# Patient Record
Sex: MALE | Employment: UNEMPLOYED | URBAN - METROPOLITAN AREA
[De-identification: names, ages, dates, MRNs, and addresses within clinical notes are randomized per-mention and may not be internally consistent; named-entity substitution may affect disease eponyms.]

---

## 2020-01-22 ENCOUNTER — HOSPITAL ENCOUNTER (OUTPATIENT)
Dept: GENERAL RADIOLOGY | Age: 45
Discharge: HOME | End: 2020-01-24
Payer: COMMERCIAL

## 2020-01-22 PROCEDURE — G0279 TOMOSYNTHESIS, MAMMO: HCPCS

## 2020-01-22 PROCEDURE — 76642 ULTRASOUND BREAST LIMITED: CPT

## 2020-01-24 ENCOUNTER — OFFICE VISIT (OUTPATIENT)
Dept: CARDIOLOGY CLINIC | Age: 45
End: 2020-01-24
Payer: COMMERCIAL

## 2020-01-24 VITALS
DIASTOLIC BLOOD PRESSURE: 86 MMHG | HEIGHT: 74 IN | HEART RATE: 68 BPM | BODY MASS INDEX: 31.06 KG/M2 | WEIGHT: 242 LBS | SYSTOLIC BLOOD PRESSURE: 122 MMHG

## 2020-01-24 PROCEDURE — G8427 DOCREV CUR MEDS BY ELIG CLIN: HCPCS | Performed by: INTERNAL MEDICINE

## 2020-01-24 PROCEDURE — 93000 ELECTROCARDIOGRAM COMPLETE: CPT | Performed by: INTERNAL MEDICINE

## 2020-01-24 PROCEDURE — G8419 CALC BMI OUT NRM PARAM NOF/U: HCPCS | Performed by: INTERNAL MEDICINE

## 2020-01-24 PROCEDURE — 99204 OFFICE O/P NEW MOD 45 MIN: CPT | Performed by: INTERNAL MEDICINE

## 2020-01-24 PROCEDURE — G8484 FLU IMMUNIZE NO ADMIN: HCPCS | Performed by: INTERNAL MEDICINE

## 2020-01-24 PROCEDURE — 1036F TOBACCO NON-USER: CPT | Performed by: INTERNAL MEDICINE

## 2020-01-24 RX ORDER — ICOSAPENT ETHYL 1000 MG/1
1 CAPSULE ORAL 2 TIMES DAILY
COMMUNITY
Start: 2020-01-12 | End: 2020-03-03 | Stop reason: ALTCHOICE

## 2020-01-24 SDOH — HEALTH STABILITY: MENTAL HEALTH: HOW OFTEN DO YOU HAVE A DRINK CONTAINING ALCOHOL?: NEVER

## 2020-01-24 NOTE — PROGRESS NOTES
meetings of clubs or organizations: Not on file     Relationship status: Not on file    Intimate partner violence:     Fear of current or ex partner: Not on file     Emotionally abused: Not on file     Physically abused: Not on file     Forced sexual activity: Not on file   Other Topics Concern    Not on file   Social History Narrative    Not on file       Family History   Problem Relation Age of Onset    Heart Disease Mother     Heart Attack Brother        Review of Systems:  Heart: as above   Lungs: as above   Eyes: denies changes in vision or discharge. Ears: denies changes in hearing or pain. Nose: denies epistaxis or masses   Throat: denies sore throat or trouble swallowing. Neuro: denies numbness, tingling, tremors. Skin: denies rashes or itching. : denies hematuria, dysuria   GI: denies vomiting, diarrhea   Psych: denies mood changed, anxiety, depression. All other systems negative. Physical Exam   /86   Pulse 68   Ht 6' 2\" (1.88 m)   Wt 242 lb (109.8 kg)   BMI 31.07 kg/m²   Constitutional: Oriented to person, place, and time. Well-developed and well-nourished. No distress. Head: Normocephalic and atraumatic. Eyes: EOM are normal. Pupils are equal, round, and reactive to light. Neck: Normal range of motion. Neck supple. No hepatojugular reflux and no JVD present. Carotid bruit is not present. No tracheal deviation present. No thyromegaly present. Cardiovascular: Normal rate, regular rhythm, normal heart sounds and intact distal pulses. Exam reveals no gallop and no friction rub. No murmur heard. Pulmonary/Chest: Effort normal and breath sounds normal. No respiratory distress. No wheezes. No rales. No tenderness. Abdominal: Soft. Bowel sounds are normal. No distension and no mass. No tenderness. No rebound and no guarding. Musculoskeletal: Normal range of motion. No edema and no tenderness. Lymphadenopathy:   No cervical adenopathy.  No groin adenopathy. Neurological: Alert and oriented to person, place, and time. Skin: Skin is warm and dry. No rash noted. Not diaphoretic. No erythema. Psychiatric: Normal mood and affect. Behavior is normal.     EKG:  normal EKG, normal sinus rhythm. ASSESSMENT AND PLAN:  Patient Active Problem List   Diagnosis    Chest pain     1. Chest pain: Stress echo. Adela Dsouza D.O.   Cardiologist  Cardiology, [facility]

## 2020-01-29 ENCOUNTER — TELEPHONE (OUTPATIENT)
Dept: CARDIOLOGY CLINIC | Age: 45
End: 2020-01-29

## 2020-02-06 ENCOUNTER — TELEPHONE (OUTPATIENT)
Dept: CARDIOLOGY | Age: 45
End: 2020-02-06

## 2020-02-07 ENCOUNTER — HOSPITAL ENCOUNTER (OUTPATIENT)
Dept: CARDIOLOGY | Age: 45
Discharge: HOME | End: 2020-02-07
Payer: COMMERCIAL

## 2020-02-07 ENCOUNTER — TELEPHONE (OUTPATIENT)
Dept: CARDIOLOGY CLINIC | Age: 45
End: 2020-02-07

## 2020-02-07 VITALS
HEART RATE: 103 BPM | DIASTOLIC BLOOD PRESSURE: 70 MMHG | SYSTOLIC BLOOD PRESSURE: 120 MMHG | WEIGHT: 242 LBS | BODY MASS INDEX: 31.06 KG/M2 | OXYGEN SATURATION: 98 % | HEIGHT: 74 IN

## 2020-02-07 PROCEDURE — 93017 CV STRESS TEST TRACING ONLY: CPT

## 2020-02-07 NOTE — PROCEDURES
average. 5. Low risk general exercise treadmill test.    Thank you for sending your patient to this [facility].     Electronically signed by Vamsi Barrera MD on 2/7/20 at 2:22 PM

## 2020-02-13 ENCOUNTER — PREP FOR PROCEDURE (OUTPATIENT)
Dept: SURGERY | Age: 45
End: 2020-02-13

## 2020-02-13 RX ORDER — SODIUM CHLORIDE 0.9 % (FLUSH) 0.9 %
10 SYRINGE (ML) INJECTION EVERY 12 HOURS SCHEDULED
Status: CANCELLED | OUTPATIENT
Start: 2020-02-13

## 2020-02-13 RX ORDER — SODIUM CHLORIDE, SODIUM LACTATE, POTASSIUM CHLORIDE, CALCIUM CHLORIDE 600; 310; 30; 20 MG/100ML; MG/100ML; MG/100ML; MG/100ML
INJECTION, SOLUTION INTRAVENOUS CONTINUOUS
Status: CANCELLED | OUTPATIENT
Start: 2020-02-13

## 2020-02-21 ENCOUNTER — OFFICE VISIT (OUTPATIENT)
Dept: CARDIOLOGY CLINIC | Age: 45
End: 2020-02-21
Payer: COMMERCIAL

## 2020-02-21 VITALS
HEART RATE: 76 BPM | HEIGHT: 74 IN | BODY MASS INDEX: 30.8 KG/M2 | SYSTOLIC BLOOD PRESSURE: 122 MMHG | WEIGHT: 240 LBS | DIASTOLIC BLOOD PRESSURE: 76 MMHG | RESPIRATION RATE: 18 BRPM

## 2020-02-21 PROCEDURE — G8417 CALC BMI ABV UP PARAM F/U: HCPCS | Performed by: INTERNAL MEDICINE

## 2020-02-21 PROCEDURE — G8427 DOCREV CUR MEDS BY ELIG CLIN: HCPCS | Performed by: INTERNAL MEDICINE

## 2020-02-21 PROCEDURE — 99214 OFFICE O/P EST MOD 30 MIN: CPT | Performed by: INTERNAL MEDICINE

## 2020-02-21 PROCEDURE — 93000 ELECTROCARDIOGRAM COMPLETE: CPT | Performed by: INTERNAL MEDICINE

## 2020-02-21 PROCEDURE — G8484 FLU IMMUNIZE NO ADMIN: HCPCS | Performed by: INTERNAL MEDICINE

## 2020-02-21 PROCEDURE — 1036F TOBACCO NON-USER: CPT | Performed by: INTERNAL MEDICINE

## 2020-02-21 NOTE — PROGRESS NOTES
Concern    Not on file   Social History Narrative    Not on file       Family History   Problem Relation Age of Onset    Heart Disease Mother     Heart Attack Brother     No Known Problems Sister     No Known Problems Brother        Review of Systems:  Heart: as above   Lungs: as above   Eyes: denies changes in vision or discharge. Ears: denies changes in hearing or pain. Nose: denies epistaxis or masses   Throat: denies sore throat or trouble swallowing. Neuro: denies numbness, tingling, tremors. Skin: denies rashes or itching. : denies hematuria, dysuria   GI: denies vomiting, diarrhea   Psych: denies mood changed, anxiety, depression. All other systems negative. Physical Exam   /76   Pulse 76   Resp 18   Ht 6' 2\" (1.88 m)   Wt 240 lb (108.9 kg)   BMI 30.81 kg/m²   Constitutional: Oriented to person, place, and time. Well-developed and well-nourished. No distress. Head: Normocephalic and atraumatic. Eyes: EOM are normal. Pupils are equal, round, and reactive to light. Neck: Normal range of motion. Neck supple. No hepatojugular reflux and no JVD present. Carotid bruit is not present. No tracheal deviation present. No thyromegaly present. Cardiovascular: Normal rate, regular rhythm, normal heart sounds and intact distal pulses. Exam reveals no gallop and no friction rub. No murmur heard. Pulmonary/Chest: Effort normal and breath sounds normal. No respiratory distress. No wheezes. No rales. No tenderness. Abdominal: Soft. Bowel sounds are normal. No distension and no mass. No tenderness. No rebound and no guarding. Musculoskeletal: Normal range of motion. No edema and no tenderness. Lymphadenopathy:   No cervical adenopathy. No groin adenopathy. Neurological: Alert and oriented to person, place, and time. Skin: Skin is warm and dry. No rash noted. Not diaphoretic. No erythema. Psychiatric: Normal mood and affect.  Behavior is normal.     EKG:  normal EKG, normal sinus rhythm. ASSESSMENT AND PLAN:  Patient Active Problem List   Diagnosis    Chest pain     1. Chest pain: Stress normal.     Andrew Walden D.O.   Cardiologist  Cardiology, [facility]

## 2020-03-03 RX ORDER — ICOSAPENT ETHYL 1000 MG/1
2 CAPSULE ORAL DAILY
COMMUNITY

## 2020-03-03 NOTE — PROGRESS NOTES
[facility] PRE-ADMISSION TESTING INSTRUCTIONS    The Preadmission Testing patient is instructed accordingly using the following criteria (check applicable):    ARRIVAL INSTRUCTIONS:  [x] Parking the day of Surgery is located in the Main Entrance lot. Upon entering the door, make an immediate right to the surgery reception desk    [] [phone] is available Monday through Friday 6 am to 6 pm    [x] Bring photo ID and insurance card    [] Bring in a copy of Living will or Durable Power of  papers. [x] Please be sure to arrange for responsible adult to provide transportation to and from the hospital    [x] Please arrange for responsible adult to be with you for the 24 hour period post procedure due to having anesthesia      GENERAL INSTRUCTIONS:    [x] Nothing by mouth after midnight, including gum, candy, mints or water    [x] You may brush your teeth, but do not swallow any water    [] Take medications as instructed with 1-2 oz of water    [] Stop herbal supplements and vitamins 5 days prior to procedure    [] Follow preop dosing of blood thinners per physician instructions    [] Take 1/2 dose of evening insulin, but no insulin after midnight    [] No oral diabetic medications after midnight    [] If diabetic and have low blood sugar or feel symptomatic, take 1-2oz apple juice only    [] Bring inhalers day of surgery    [] Bring C-PAP/ Bi-Pap day of surgery    [] Bring urine specimen day of surgery    [x] Shower or bath with soap, lather and rinse well, AM of Surgery, no lotion, powders or creams to surgical site    [] Follow bowel prep as instructed per surgeon    [x] No tobacco products within 24 hours of surgery     [x] No alcohol or illegal drug use within 24 hours of surgery.     [x] Jewelry, body piercing's, eyeglasses, contact lenses and dentures are not permitted into surgery (bring cases)      [] Please do not wear any nail polish, make up or hair products on the day of surgery    [x] If not already done, you can expect a call from registration    [x] You can expect a call the business day prior to procedure to notify you if your arrival time changes    [x] If you receive a survey after surgery we would greatly appreciate your comments    [] Parent/guardian of a minor must accompany their child and remain on the premises  the entire time they are under our care     [] Pediatric patients may bring favorite toy, blanket or comfort item with them    [] A caregiver or family member must remain with the patient during their stay if they are mentally handicapped, have dementia, disoriented or unable to use a call light or would be a safety concern if left unattended    [x] Please notify surgeon if you develop any illness between now and time of surgery (cold, cough, sore throat, fever, nausea, vomiting) or any signs of infections  including skin, wounds, and dental.    [x]  The Outpatient Pharmacy is available to fill your prescription here on your day of surgery, ask your preop nurse for details    [] Other instructions  EDUCATIONAL MATERIALS PROVIDED:    [] PAT Preoperative Education Packet/Booklet     [] Medication List    [] Fluoroscopy Information Pamphlet    [] Transfusion bracelet applied with instructions    [] Joint replacement video reviewed    [] Shower with soap, lather and rinse well, and use CHG wipes provided the evening before surgery as instructed

## 2020-03-05 ENCOUNTER — ANESTHESIA EVENT (OUTPATIENT)
Dept: OPERATING ROOM | Age: 45
End: 2020-03-05
Payer: COMMERCIAL

## 2020-03-06 ENCOUNTER — ANESTHESIA (OUTPATIENT)
Dept: OPERATING ROOM | Age: 45
End: 2020-03-06
Payer: COMMERCIAL

## 2020-03-06 ENCOUNTER — HOSPITAL ENCOUNTER (OUTPATIENT)
Age: 45
Setting detail: OUTPATIENT SURGERY
Discharge: HOME | End: 2020-03-06
Attending: SURGERY | Admitting: SURGERY
Payer: COMMERCIAL

## 2020-03-06 VITALS
BODY MASS INDEX: 31.44 KG/M2 | SYSTOLIC BLOOD PRESSURE: 110 MMHG | HEIGHT: 74 IN | HEART RATE: 66 BPM | OXYGEN SATURATION: 97 % | TEMPERATURE: 97.7 F | DIASTOLIC BLOOD PRESSURE: 67 MMHG | RESPIRATION RATE: 20 BRPM | WEIGHT: 245 LBS

## 2020-03-06 VITALS
RESPIRATION RATE: 10 BRPM | DIASTOLIC BLOOD PRESSURE: 74 MMHG | SYSTOLIC BLOOD PRESSURE: 122 MMHG | OXYGEN SATURATION: 98 %

## 2020-03-06 PROBLEM — G89.18 POSTOPERATIVE PAIN: Status: ACTIVE | Noted: 2020-03-06

## 2020-03-06 PROBLEM — N62 GYNECOMASTIA: Status: ACTIVE | Noted: 2020-03-06

## 2020-03-06 PROCEDURE — 3700000001 HC ADD 15 MINUTES (ANESTHESIA): Performed by: SURGERY

## 2020-03-06 PROCEDURE — 7100000011 HC PHASE II RECOVERY - ADDTL 15 MIN: Performed by: SURGERY

## 2020-03-06 PROCEDURE — 3600000012 HC SURGERY LEVEL 2 ADDTL 15MIN: Performed by: SURGERY

## 2020-03-06 PROCEDURE — 6360000002 HC RX W HCPCS

## 2020-03-06 PROCEDURE — 3700000000 HC ANESTHESIA ATTENDED CARE: Performed by: SURGERY

## 2020-03-06 PROCEDURE — 6360000002 HC RX W HCPCS: Performed by: SURGERY

## 2020-03-06 PROCEDURE — 3600000002 HC SURGERY LEVEL 2 BASE: Performed by: SURGERY

## 2020-03-06 PROCEDURE — 2580000003 HC RX 258: Performed by: SURGERY

## 2020-03-06 PROCEDURE — 2500000003 HC RX 250 WO HCPCS

## 2020-03-06 PROCEDURE — 2500000003 HC RX 250 WO HCPCS: Performed by: SURGERY

## 2020-03-06 PROCEDURE — 88305 TISSUE EXAM BY PATHOLOGIST: CPT

## 2020-03-06 PROCEDURE — 2709999900 HC NON-CHARGEABLE SUPPLY: Performed by: SURGERY

## 2020-03-06 PROCEDURE — 7100000010 HC PHASE II RECOVERY - FIRST 15 MIN: Performed by: SURGERY

## 2020-03-06 RX ORDER — FENTANYL CITRATE 50 UG/ML
50 INJECTION, SOLUTION INTRAMUSCULAR; INTRAVENOUS EVERY 5 MIN PRN
Status: DISCONTINUED | OUTPATIENT
Start: 2020-03-06 | End: 2020-03-06 | Stop reason: HOSPADM

## 2020-03-06 RX ORDER — DIPHENHYDRAMINE HYDROCHLORIDE 50 MG/ML
12.5 INJECTION INTRAMUSCULAR; INTRAVENOUS
Status: DISCONTINUED | OUTPATIENT
Start: 2020-03-06 | End: 2020-03-06 | Stop reason: HOSPADM

## 2020-03-06 RX ORDER — LIDOCAINE HYDROCHLORIDE 10 MG/ML
INJECTION, SOLUTION EPIDURAL; INFILTRATION; INTRACAUDAL; PERINEURAL PRN
Status: DISCONTINUED | OUTPATIENT
Start: 2020-03-06 | End: 2020-03-06 | Stop reason: ALTCHOICE

## 2020-03-06 RX ORDER — FENTANYL CITRATE 50 UG/ML
INJECTION, SOLUTION INTRAMUSCULAR; INTRAVENOUS PRN
Status: DISCONTINUED | OUTPATIENT
Start: 2020-03-06 | End: 2020-03-06 | Stop reason: SDUPTHER

## 2020-03-06 RX ORDER — HYDROCODONE BITARTRATE AND ACETAMINOPHEN 5; 325 MG/1; MG/1
1 TABLET ORAL EVERY 6 HOURS PRN
Qty: 15 TABLET | Refills: 0 | Status: SHIPPED | OUTPATIENT
Start: 2020-03-06 | End: 2020-03-09

## 2020-03-06 RX ORDER — MEPERIDINE HYDROCHLORIDE 25 MG/ML
12.5 INJECTION INTRAMUSCULAR; INTRAVENOUS; SUBCUTANEOUS EVERY 5 MIN PRN
Status: DISCONTINUED | OUTPATIENT
Start: 2020-03-06 | End: 2020-03-06 | Stop reason: HOSPADM

## 2020-03-06 RX ORDER — KETAMINE HYDROCHLORIDE 10 MG/ML
INJECTION, SOLUTION INTRAMUSCULAR; INTRAVENOUS PRN
Status: DISCONTINUED | OUTPATIENT
Start: 2020-03-06 | End: 2020-03-06 | Stop reason: SDUPTHER

## 2020-03-06 RX ORDER — PROPOFOL 10 MG/ML
INJECTION, EMULSION INTRAVENOUS CONTINUOUS PRN
Status: DISCONTINUED | OUTPATIENT
Start: 2020-03-06 | End: 2020-03-06 | Stop reason: SDUPTHER

## 2020-03-06 RX ORDER — SODIUM CHLORIDE, SODIUM LACTATE, POTASSIUM CHLORIDE, CALCIUM CHLORIDE 600; 310; 30; 20 MG/100ML; MG/100ML; MG/100ML; MG/100ML
INJECTION, SOLUTION INTRAVENOUS CONTINUOUS
Status: DISCONTINUED | OUTPATIENT
Start: 2020-03-06 | End: 2020-03-06 | Stop reason: HOSPADM

## 2020-03-06 RX ORDER — MIDAZOLAM HYDROCHLORIDE 1 MG/ML
INJECTION INTRAMUSCULAR; INTRAVENOUS PRN
Status: DISCONTINUED | OUTPATIENT
Start: 2020-03-06 | End: 2020-03-06 | Stop reason: SDUPTHER

## 2020-03-06 RX ORDER — CEFAZOLIN SODIUM 2 G/50ML
2 SOLUTION INTRAVENOUS
Status: COMPLETED | OUTPATIENT
Start: 2020-03-06 | End: 2020-03-06

## 2020-03-06 RX ORDER — OXYCODONE HYDROCHLORIDE AND ACETAMINOPHEN 5; 325 MG/1; MG/1
1 TABLET ORAL
Status: DISCONTINUED | OUTPATIENT
Start: 2020-03-06 | End: 2020-03-06 | Stop reason: HOSPADM

## 2020-03-06 RX ORDER — PROMETHAZINE HYDROCHLORIDE 25 MG/ML
6.25 INJECTION, SOLUTION INTRAMUSCULAR; INTRAVENOUS
Status: DISCONTINUED | OUTPATIENT
Start: 2020-03-06 | End: 2020-03-06 | Stop reason: HOSPADM

## 2020-03-06 RX ORDER — SODIUM CHLORIDE 0.9 % (FLUSH) 0.9 %
10 SYRINGE (ML) INJECTION EVERY 12 HOURS SCHEDULED
Status: DISCONTINUED | OUTPATIENT
Start: 2020-03-06 | End: 2020-03-06 | Stop reason: HOSPADM

## 2020-03-06 RX ORDER — FENTANYL CITRATE 50 UG/ML
25 INJECTION, SOLUTION INTRAMUSCULAR; INTRAVENOUS EVERY 5 MIN PRN
Status: DISCONTINUED | OUTPATIENT
Start: 2020-03-06 | End: 2020-03-06 | Stop reason: HOSPADM

## 2020-03-06 RX ORDER — PROPOFOL 10 MG/ML
INJECTION, EMULSION INTRAVENOUS PRN
Status: DISCONTINUED | OUTPATIENT
Start: 2020-03-06 | End: 2020-03-06 | Stop reason: SDUPTHER

## 2020-03-06 RX ADMIN — FENTANYL CITRATE 25 MCG: 50 INJECTION, SOLUTION INTRAMUSCULAR; INTRAVENOUS at 10:15

## 2020-03-06 RX ADMIN — PROPOFOL 100 MCG/KG/MIN: 10 INJECTION, EMULSION INTRAVENOUS at 09:53

## 2020-03-06 RX ADMIN — KETAMINE HYDROCHLORIDE 30 MG: 10 INJECTION, SOLUTION INTRAMUSCULAR; INTRAVENOUS at 09:53

## 2020-03-06 RX ADMIN — SODIUM CHLORIDE, POTASSIUM CHLORIDE, SODIUM LACTATE AND CALCIUM CHLORIDE: 600; 310; 30; 20 INJECTION, SOLUTION INTRAVENOUS at 10:44

## 2020-03-06 RX ADMIN — FENTANYL CITRATE 25 MCG: 50 INJECTION, SOLUTION INTRAMUSCULAR; INTRAVENOUS at 09:55

## 2020-03-06 RX ADMIN — MIDAZOLAM 2 MG: 1 INJECTION INTRAMUSCULAR; INTRAVENOUS at 09:46

## 2020-03-06 RX ADMIN — FENTANYL CITRATE 25 MCG: 50 INJECTION, SOLUTION INTRAMUSCULAR; INTRAVENOUS at 10:01

## 2020-03-06 RX ADMIN — CEFAZOLIN SODIUM 2 G: 2 SOLUTION INTRAVENOUS at 09:45

## 2020-03-06 RX ADMIN — SODIUM CHLORIDE, POTASSIUM CHLORIDE, SODIUM LACTATE AND CALCIUM CHLORIDE: 600; 310; 30; 20 INJECTION, SOLUTION INTRAVENOUS at 08:45

## 2020-03-06 RX ADMIN — PROPOFOL 50 MG: 10 INJECTION, EMULSION INTRAVENOUS at 09:50

## 2020-03-06 RX ADMIN — FENTANYL CITRATE 25 MCG: 50 INJECTION, SOLUTION INTRAMUSCULAR; INTRAVENOUS at 10:09

## 2020-03-06 RX ADMIN — SODIUM CHLORIDE, POTASSIUM CHLORIDE, SODIUM LACTATE AND CALCIUM CHLORIDE: 600; 310; 30; 20 INJECTION, SOLUTION INTRAVENOUS at 09:47

## 2020-03-06 ASSESSMENT — PAIN DESCRIPTION - PAIN TYPE
TYPE: SURGICAL PAIN

## 2020-03-06 ASSESSMENT — PAIN DESCRIPTION - ORIENTATION
ORIENTATION: LEFT;RIGHT
ORIENTATION: LEFT
ORIENTATION: RIGHT;LEFT

## 2020-03-06 ASSESSMENT — PAIN DESCRIPTION - PROGRESSION
CLINICAL_PROGRESSION: NOT CHANGED
CLINICAL_PROGRESSION: NOT CHANGED
CLINICAL_PROGRESSION: GRADUALLY WORSENING

## 2020-03-06 ASSESSMENT — PAIN DESCRIPTION - DESCRIPTORS
DESCRIPTORS: ACHING
DESCRIPTORS: DISCOMFORT

## 2020-03-06 ASSESSMENT — PAIN SCALES - GENERAL
PAINLEVEL_OUTOF10: 0
PAINLEVEL_OUTOF10: 2
PAINLEVEL_OUTOF10: 0
PAINLEVEL_OUTOF10: 2

## 2020-03-06 ASSESSMENT — PAIN DESCRIPTION - FREQUENCY: FREQUENCY: CONTINUOUS

## 2020-03-06 ASSESSMENT — PAIN DESCRIPTION - LOCATION
LOCATION: BREAST

## 2020-03-06 ASSESSMENT — PAIN - FUNCTIONAL ASSESSMENT: PAIN_FUNCTIONAL_ASSESSMENT: 0-10

## 2020-03-06 NOTE — OP NOTE
OPERATIVE NOTE    DATE OF PROCEDURE: 3/6/2020     SURGEON: Joy Henriquez MD    ASSISTANT: Hernán Spencer MD PGy3    PREOPERATIVE DIAGNOSIS: bilateral gynecomastia    POSTOPERATIVE DIAGNOSIS: same    OPERATION: excision of breast mass, bilateral    ANESTHESIA: MAC    ESTIMATED BLOOD LOSS:  89EX     COMPLICATIONS: None     SPECIMENS:  left breast subareolar tissue, right breast subareolar tissue    HISTORY: The patient is a 45y.o. year old male with history of symptomatic bilateral gynecomastia. Bilateral excision was recommended. Risks, benefits, and alternatives including bleeding, infection, and potential need for further surgery depending on pathology results were discussed with the patient who agreed to proceed. PROCEDURE:  The patient was brought to the OR suite and positioned supine. LMAC anesthesia was obtained without complication. Ancef 2g IV was given. PCDs were placed and functioning. The chest was prepped with Chloraprep and draped in the usual fashion. Bilateral inferior periareolar incisions were marked and infiltrated with 1% lidocaine. On the left, skin incision was made with cautery and carried down through dermis and breast tissue. Cautery was used to raise the areolar nipple complex with a good flap. Retroareolar breast tissue was grasped with Allis clamps and superior, inferior, lateral, and posterior margins were dissected with cautery. The breast tissue was removed. Oriention was marked with nylon sutures, with a short stitch superior and a long stitch lateral. On the right side, a similar dissection was carried out and right retroareolar breast tissue was removed. This specimen was also marked for orientation with a short stitch superior and a long stitch lateral. Specimens were sent to pathology. Both incisions were irrigated with saline and hemostasis was achieved with cautery. The deep tissue of both incisions were brought together with 3-0 Vicryl interrupted stitches.  The skin was

## 2020-03-06 NOTE — ANESTHESIA PRE PROCEDURE
GI/Hepatic/Renal:            ROS comment: S/p lap elizabeth (12 years ago). Endo/Other: Negative Endo/Other ROS                    Abdominal:   (+) obese,         Vascular: negative vascular ROS. Anesthesia Plan      MAC     ASA 2     (Backup GA if needed)  Induction: intravenous. Anesthetic plan and risks discussed with patient. Plan discussed with CRNA.                 Sabino Rivas MD   3/6/2020

## 2020-03-06 NOTE — PROGRESS NOTES
PT RECEIVED IN PACU 2 FROM SURGERY REPORT RECEIVED ASSESSMENT AND VS OBTAINED PT DROWSY AWAKE BUT APPROPRIATE, NOURISHMENT GIVEN FAMILY CALLED TO BE WITH PT AT THIS TIME ICE TO SITES  1130 PT TAKING PO REMAINS COMFORTABLE AT THIS TIME WAITNG FOR PRESCRIPTIONS TO BE FILLED   1215 PT COMPLAIN SOME NAUSEA IV FLUIDS INCREASED AT THIS TIME WILL MONITOR   1230 PT FEELS SLIGHTLY BETTER ASKING TO SIT UP AT THIS TIME ON SIDE OF BED  1240 PT HAD EMESIS  CC  STATES FEELS BETTER WANTING TO GO HOME DISCHARGE INSTRUCTIONS GIVEN TO PT AND HIS WIFE AT THIS TIME BOTH VERBALIZED UNDERSTANDING OF INSTRUCTIONS  1300  PT COMFORTABLE FEELS BETTER DRINKING CLEAR LIQUIDS EDEL ANY NAUSEA AT THIS TIME

## 2020-04-03 ENCOUNTER — TELEPHONE (OUTPATIENT)
Dept: CARDIOLOGY CLINIC | Age: 45
End: 2020-04-03

## 2020-10-15 ENCOUNTER — HOSPITAL ENCOUNTER (OUTPATIENT)
Age: 45
Discharge: HOME | End: 2020-10-17

## 2020-10-15 PROCEDURE — 88305 TISSUE EXAM BY PATHOLOGIST: CPT

## 2020-11-02 ENCOUNTER — HOSPITAL ENCOUNTER (OUTPATIENT)
Age: 45
Discharge: HOME | End: 2020-11-04

## 2020-11-02 PROCEDURE — 88304 TISSUE EXAM BY PATHOLOGIST: CPT

## 2021-04-23 ENCOUNTER — HOSPITAL ENCOUNTER (OUTPATIENT)
Dept: GENERAL RADIOLOGY | Age: 46
Discharge: HOME | End: 2021-04-25
Payer: COMMERCIAL

## 2021-04-23 DIAGNOSIS — N63.10 LUMP OF BREAST, RIGHT: ICD-10-CM

## 2021-04-23 DIAGNOSIS — N64.4 BREAST PAIN: ICD-10-CM

## 2021-04-23 DIAGNOSIS — N62 HYPERTROPHY OF BREAST: ICD-10-CM

## 2021-04-23 PROCEDURE — 76642 ULTRASOUND BREAST LIMITED: CPT

## 2021-04-23 PROCEDURE — 77066 DX MAMMO INCL CAD BI: CPT

## 2021-06-03 ENCOUNTER — TELEPHONE (OUTPATIENT)
Dept: CARDIOLOGY CLINIC | Age: 46
End: 2021-06-03

## 2021-06-03 DIAGNOSIS — R07.9 CHEST PAIN, UNSPECIFIED TYPE: Primary | ICD-10-CM

## 2021-06-03 NOTE — TELEPHONE ENCOUNTER
Patient states that he has been having intermittent chest pain mainly on the left side, he is also having SOB, bp was 148/80 at pcp office yesterday per patient, please advise

## 2021-06-03 NOTE — TELEPHONE ENCOUNTER
See if we can get a coronary CTA approved. Omar Mauro D.O.   Cardiologist  Cardiology, [facility]

## 2021-06-16 ENCOUNTER — TELEPHONE (OUTPATIENT)
Dept: CARDIOLOGY CLINIC | Age: 46
End: 2021-06-16

## 2021-06-16 DIAGNOSIS — Z01.818 PREOPERATIVE TESTING: Primary | ICD-10-CM

## 2021-06-16 NOTE — TELEPHONE ENCOUNTER
We tried to schedule patient for coronary CTA but to do so he has to get a COVID test, patient is refusing stating he doesn't believe in the test or vaccine and will not do it.  Test will not be scheduled without a COVID test.

## 2021-06-23 DIAGNOSIS — R07.9 CHEST PAIN, UNSPECIFIED TYPE: Primary | ICD-10-CM

## 2021-07-20 ENCOUNTER — TELEPHONE (OUTPATIENT)
Dept: CARDIOLOGY | Age: 46
End: 2021-07-20

## (undated) DEVICE — BANDAGE COMPR W6INXL10YD ST M E WHITE/BEIGE

## (undated) DEVICE — BLADE CLIPPER GEN PURP NS

## (undated) DEVICE — ELECTRODE PT RET AD L9FT HI MOIST COND ADH HYDRGEL CORDED

## (undated) DEVICE — PACK PROCEDURE SURG GEN CUST

## (undated) DEVICE — SHEET, T, LAPAROTOMY, STERILE: Brand: MEDLINE

## (undated) DEVICE — CHLORAPREP 26ML ORANGE

## (undated) DEVICE — INTENDED FOR TISSUE SEPARATION, AND OTHER PROCEDURES THAT REQUIRE A SHARP SURGICAL BLADE TO PUNCTURE OR CUT.: Brand: BARD-PARKER ® STAINLESS STEEL BLADES

## (undated) DEVICE — DRESSING TRNSPAR W4XL4.75IN FLM FLEXIGRID

## (undated) DEVICE — SUTURE VCRL + SZ 3-0 L18IN ABSRB UD PS-1 L24MM 3/8 CIR PRIM VCP683G

## (undated) DEVICE — PENCIL ES CRD L10FT HND SWCHING ROCK SWCH W/ EDGE COAT BLDE

## (undated) DEVICE — DOUBLE BASIN SET: Brand: MEDLINE INDUSTRIES, INC.

## (undated) DEVICE — GOWN,SIRUS,FABRNF,XL,20/CS: Brand: MEDLINE

## (undated) DEVICE — Z DUP USE 2257490 ADHESIVE SKIN CLSRE 036ML TPCL 2CTL CNCRLTE HIGH VSCSTY DRMB

## (undated) DEVICE — TOWEL,OR,DSP,ST,BLUE,STD,6/PK,12PK/CS: Brand: MEDLINE

## (undated) DEVICE — COVER,MAYO STAND,STERILE: Brand: MEDLINE

## (undated) DEVICE — NEEDLE HYPO 25GA L1.5IN BLU POLYPR HUB S STL REG BVL STR

## (undated) DEVICE — ADHESIVE SKIN CLSR 0.7ML TOP DERMBND ADV

## (undated) DEVICE — RETRACTOR WEITLANER BLUNT

## (undated) DEVICE — GAUZE,SPONGE,4"X4",8PLY,STRL,LF,10/TRAY: Brand: MEDLINE

## (undated) DEVICE — 4-PORT MANIFOLD: Brand: NEPTUNE 2